# Patient Record
Sex: MALE | URBAN - METROPOLITAN AREA
[De-identification: names, ages, dates, MRNs, and addresses within clinical notes are randomized per-mention and may not be internally consistent; named-entity substitution may affect disease eponyms.]

---

## 2023-11-26 ENCOUNTER — HOSPITAL ENCOUNTER (EMERGENCY)
Facility: HOSPITAL | Age: 28
Discharge: HOME | End: 2023-11-26
Attending: EMERGENCY MEDICINE

## 2023-11-26 VITALS
TEMPERATURE: 98 F | SYSTOLIC BLOOD PRESSURE: 171 MMHG | BODY MASS INDEX: 19.89 KG/M2 | RESPIRATION RATE: 16 BRPM | OXYGEN SATURATION: 99 % | DIASTOLIC BLOOD PRESSURE: 71 MMHG | HEIGHT: 74 IN | WEIGHT: 155 LBS | HEART RATE: 96 BPM

## 2023-11-26 DIAGNOSIS — R20.2 PARESTHESIAS: ICD-10-CM

## 2023-11-26 DIAGNOSIS — M54.10 RADICULOPATHY, UNSPECIFIED SPINAL REGION: Primary | ICD-10-CM

## 2023-11-26 DIAGNOSIS — M62.830 MUSCLE SPASM OF BACK: ICD-10-CM

## 2023-11-26 PROCEDURE — 99284 EMERGENCY DEPT VISIT MOD MDM: CPT

## 2023-11-26 RX ORDER — OMEPRAZOLE 40 MG/1
40 CAPSULE, DELAYED RELEASE ORAL DAILY
Qty: 14 CAPSULE | Refills: 0 | Status: SHIPPED | OUTPATIENT
Start: 2023-11-26 | End: 2023-12-10

## 2023-11-26 RX ORDER — CYCLOBENZAPRINE HCL 5 MG
5 TABLET ORAL 3 TIMES DAILY PRN
Qty: 15 TABLET | Refills: 0 | Status: SHIPPED | OUTPATIENT
Start: 2023-11-26 | End: 2023-12-03

## 2023-11-26 RX ORDER — NAPROXEN 375 MG/1
375 TABLET ORAL 2 TIMES DAILY WITH MEALS
Qty: 14 TABLET | Refills: 0 | Status: SHIPPED | OUTPATIENT
Start: 2023-11-26 | End: 2023-12-03

## 2023-11-26 NOTE — ED PROVIDER NOTES
"Encounter Date: 11/26/2023       History     Chief Complaint   Patient presents with    Numbness     States fall 1 week ago and w/ intermittent r side paraesthesia since     28 M presents for evaluation of paresthesias.  He states long time ago he would an injury where he sustained a right-sided rib fracture.  He states about a week ago he fell in the bathroom at work and had some paresthesias on the "whole right side" of his body.  He states he felt like he would some intermittent weakness on that side and was not able to work at the time due to that.  That has now improved.  He states he knees documentation to go back to work because he missed several days of work.  He is no complaints at present states he feels fine and better but needs documentation to go back to look.  He is not established with a primary doctor.  Denies any other past medical history.  He smokes.  He drinks a half pt of vodka daily in his currently weaning down because he previously drank more.  Denies any kidney issues denies any stomach ulcers.  No hematemesis or blood in his stool.  When he had the fall I do not hit his head or lose consciousness        Review of patient's allergies indicates:  No Known Allergies  No past medical history on file.  No past surgical history on file.  No family history on file.     Review of Systems   Constitutional:  Negative for chills and fever.   Respiratory:  Negative for cough, chest tightness and shortness of breath.    Cardiovascular:  Negative for chest pain.   Gastrointestinal:  Negative for abdominal pain, nausea and vomiting.   Musculoskeletal:  Negative for myalgias and neck pain.   Neurological:  Positive for numbness. Negative for syncope.   All other systems reviewed and are negative.      Physical Exam     Initial Vitals [11/26/23 0143]   BP Pulse Resp Temp SpO2   (!) 171/71 96 16 98.2 °F (36.8 °C) 99 %      MAP       --         Physical Exam    Nursing note and vitals " reviewed.  Constitutional: He appears well-developed and well-nourished. No distress.   HENT:   Head: Normocephalic and atraumatic.   Eyes: Conjunctivae are normal.   Cardiovascular:  Normal rate.           Pulmonary/Chest: No respiratory distress. He has no wheezes. He has no rhonchi.   Abdominal: Abdomen is soft. There is no abdominal tenderness. There is no rebound and no guarding.   Musculoskeletal:         General: Normal range of motion.     Neurological: He is alert and oriented to person, place, and time. He has normal strength.   Cranial nerves 2-12 are intact. Nl strength deltoids, 5/5 f/e elbows, wrists, nl hand .  He reports light touch in the right hand is slightly less than the left hand.Nl hip flexion 5/5 knee, ankle, great toes.    Skin: Skin is warm and dry.   Psychiatric: He has a normal mood and affect.         ED Course   Procedures  Labs Reviewed - No data to display       Imaging Results    None          Medications - No data to display  Medical Decision Making  Is no cervicothoracic or lumbar tenderness palpation does have some right paraspinous muscle spasms.  He has normal strength in the upper and lower extremities with no cranial nerve deficits.  He states the pelvis a week ago in his symptoms has been improving.  There is no indication for emergent imaging.  He states he really just needs a document so he can return to work.  He did not follow up with a provider after the fall.  I informed him he really needs to follow up with the primary care provider and may need further workup discussed with him that due to his daily alcohol use he might have nutritional deficits that could cause paresthesias.  He also may need imaging of his neck or back however since this is not new and has been improving not be appropriate to do it emergently.  He expressed understanding.  Discussed risk of NSAIDs with alcohol use can cause ulcers or bleeding.  He states he has been cutting back his alcohol and  plans to continue to do so.  Discussed that the Flexeril can make him weak can not work while taking it.    Problems Addressed:  Muscle spasm of back: acute illness or injury  Paresthesias: acute illness or injury    Risk  Prescription drug management.                                   Clinical Impression:  Final diagnoses:  [M54.10] Radiculopathy, unspecified spinal region (Primary)  [R20.2] Paresthesias  [M62.830] Muscle spasm of back          ED Disposition Condition    Discharge Stable          ED Prescriptions       Medication Sig Dispense Start Date End Date Auth. Provider    omeprazole (PRILOSEC) 40 MG capsule Take 1 capsule (40 mg total) by mouth once daily. for 14 days 14 capsule 11/26/2023 12/10/2023 Adelso Salas MD    naproxen (NAPROSYN) 375 MG tablet Take 1 tablet (375 mg total) by mouth 2 (two) times daily with meals. for 7 days 14 tablet 11/26/2023 12/3/2023 Adelso Salas MD    cyclobenzaprine (FLEXERIL) 5 MG tablet Take 1 tablet (5 mg total) by mouth 3 (three) times daily as needed for Muscle spasms. 15 tablet 11/26/2023 12/3/2023 Adelso Salas MD          Follow-up Information       Follow up With Specialties Details Why Contact Info    Primary care provider   You can call [phone] to get set up with a local primary care provider within the next few days.If your symptoms worsen or change please return to the emergency department for re-evaluation Call your primary care provider to schedule a follow-up appointment within a week             Adelso Salas MD  11/26/23 2616

## 2023-11-26 NOTE — Clinical Note
"Nuryi "Emeli Chan was seen and treated in our emergency department on 11/26/2023.  He may return to work on 11/27/2023.       If you have any questions or concerns, please don't hesitate to call.      Adelso Salas MD"

## 2023-11-26 NOTE — DISCHARGE INSTRUCTIONS
You need to follow up with the primary care provider.  Call the number listed above you will need some further testing likely need some vitamin testing possibly some imaging of your neck or back if you continue to have issues.  Do not take the cyclobenzaprine while you are working or driving